# Patient Record
Sex: FEMALE | Race: OTHER | Employment: FULL TIME | URBAN - NONMETROPOLITAN AREA
[De-identification: names, ages, dates, MRNs, and addresses within clinical notes are randomized per-mention and may not be internally consistent; named-entity substitution may affect disease eponyms.]

---

## 2021-01-14 VITALS — HEIGHT: 62 IN

## 2021-02-25 ENCOUNTER — OFFICE VISIT (OUTPATIENT)
Dept: OBGYN CLINIC | Age: 51
End: 2021-02-25
Payer: COMMERCIAL

## 2021-02-25 VITALS
WEIGHT: 163 LBS | DIASTOLIC BLOOD PRESSURE: 86 MMHG | HEIGHT: 62 IN | SYSTOLIC BLOOD PRESSURE: 136 MMHG | BODY MASS INDEX: 30 KG/M2

## 2021-02-25 DIAGNOSIS — Z01.419 WOMEN'S ANNUAL ROUTINE GYNECOLOGICAL EXAMINATION: Primary | ICD-10-CM

## 2021-02-25 PROCEDURE — 99396 PREV VISIT EST AGE 40-64: CPT | Performed by: OBSTETRICS & GYNECOLOGY

## 2021-02-25 ASSESSMENT — ENCOUNTER SYMPTOMS
CONSTIPATION: 0
ABDOMINAL PAIN: 0
SHORTNESS OF BREATH: 0
DIARRHEA: 0

## 2021-02-25 NOTE — PROGRESS NOTES
DATE OF VISIT:  21  PATIENT NAME:  Humphrey Espinoza     YOB: 1970    44 y.o. Chief Complaint   Patient presents with    Annual Exam     s/p hyst.  last mamm 2021.  never had bone density or colonoscopy. Pt. needs refill of metformin to [facility]. No LMP recorded. Patient has had a hysterectomy. Primary Care Physician: Isis Rushing    The patient was seen and examined. She has no chiefcomplaint today and is here for her annual exam.  Her bowels are regular. There are no voiding complaints. She denies any bloating. She denies vaginal discharge and was counseled on STD's and the need for barriercontraception.      HPI : Alba Rees is a 44 y.o. female  who presents today for her annual.    ______________________________________________________________________    OB History    Para Term  AB Living   2 1           SAB TAB Ectopic Molar Multiple Live Births                    # Outcome Date GA Lbr Balbir/2nd Weight Sex Delivery Anes PTL Lv   2             1 Para              Past Medical History:   Diagnosis Date    Abnormal Pap smear of cervix     Uterine fibroid                                                                    Past Surgical History:   Procedure Laterality Date    ANKLE SURGERY      BREAST LUMPECTOMY      HAND SURGERY Right 2020    pins placed s/p fracture    HAND SURGERY Right 2020    pins removed    HYSTERECTOMY, VAGINAL  2019    uterus and cervix with BSO    NOSE SURGERY       Family History   Problem Relation Age of Onset    Diabetes Father     Heart Disease Father     Hypertension Father     Diabetes Mother     Hypertension Mother      Social History     Socioeconomic History    Marital status:      Spouse name: Not on file    Number of children: Not on file    Years of education: Not on file    Highest education level: Not on file   Occupational History    Not on file Social Needs    Financial resource strain: Not on file    Food insecurity     Worry: Not on file     Inability: Not on file    Transportation needs     Medical: Not on file     Non-medical: Not on file   Tobacco Use    Smoking status: Never Smoker    Smokeless tobacco: Never Used   Substance and Sexual Activity    Alcohol use: Yes    Drug use: Never    Sexual activity: Yes   Lifestyle    Physical activity     Days per week: Not on file     Minutes per session: Not on file    Stress: Not on file   Relationships    Social connections     Talks on phone: Not on file     Gets together: Not on file     Attends Jew service: Not on file     Active member of club or organization: Not on file     Attends meetings of clubs or organizations: Not on file     Relationship status: Not on file    Intimate partner violence     Fear of current or ex partner: Not on file     Emotionally abused: Not on file     Physically abused: Not on file     Forced sexual activity: Not on file   Other Topics Concern    Not on file   Social History Narrative    Not on file     Vitals:    02/25/21 1556   BP: 136/86   Site: Right Upper Arm   Position: Sitting   Weight: 163 lb (73.9 kg)   Height: 5' 1.75\" (1.568 m)     Body mass index is 30.05 kg/m². No LMP recorded. Patient has had a hysterectomy. MEDICATIONS:  Current Outpatient Medications   Medication Sig Dispense Refill    metFORMIN (GLUCOPHAGE) 500 MG tablet Take 2 tablets by mouth 2 times daily (with meals) 360 tablet 3    Multiple Vitamins-Minerals (MULTIVITAMIN ADULT PO) Take by mouth       No current facility-administered medications for this visit.         ALLERGIES:  Allergies as of 02/25/2021    (No Known Allergies)           Symptoms of decreased mood absent    **If either question is answered in a  positive fashion then completethe PHQ9 Scoring Evaluation and make the appropriate referral**      Gynecologic History: No LMP recorded. Patient has had a hysterectomy. Sexually Active: Yes    STD History: No     Hormone Replacement Exposure: No      Genetic Qualified Family History of Breast, Ovarian , Colon or Uterine Cancer:No   If YES see scanned worksheet. Preventative Health Testing:  Colposcopy History:   Date of Last Mammogram: 2021  Date of Last Colonoscopy:   Date of Last Bone Density:never - offered to order - pt considering      ______________________________________________________________________    REVIEW OF SYSTEMS:       Review of Systems   Constitutional: Negative for chills, fatigue and fever. Respiratory: Negative for shortness of breath. Cardiovascular: Negative for chest pain. Gastrointestinal: Negative for abdominal pain, constipation and diarrhea. Genitourinary: Negative for dysuria, enuresis, frequency, menstrual problem, pelvic pain, urgency and vaginal bleeding. Had some hot flashes initially after tlh/bso but they resolved   Neurological: Negative for dizziness, light-headedness and headaches. PHYSICAL EXAM:    Physical Exam  Constitutional:       Appearance: Normal appearance. Genitourinary:      Pelvic exam was performed with patient in the lithotomy position. Vulva and vagina normal.      Cervix is absent. Uterus is absent. Right adnexa absent. Left adnexa absent. HENT:      Head: Atraumatic. Mouth/Throat:      Mouth: Mucous membranes are moist.   Eyes:      Extraocular Movements: Extraocular movements intact. Pupils: Pupils are equal, round, and reactive to light. Neck:      Musculoskeletal: Normal range of motion. Cardiovascular:      Rate and Rhythm: Normal rate. Pulmonary:      Effort: Pulmonary effort is normal.   Chest:      Breasts:         Right: Normal.         Left: Normal.   Abdominal:      General: There is no distension. Palpations: Abdomen is soft. Tenderness: There is no abdominal tenderness. Musculoskeletal: Normal range of motion. Neurological:      Mental Status: She is alert and oriented to person, place, and time. Skin:     General: Skin is warm and dry. Psychiatric:         Mood and Affect: Mood normal.         Behavior: Behavior normal.         Thought Content: Thought content normal.         Judgment: Judgment normal.   Chaperone present: chaperone declined. POC Cultures:  No results found for this visit on 02/25/21. ASSESSMENT:      44 y.o. Annual  1. Women's annual routine gynecological examination          There are no active problems to display for this patient. Hereditary Breast, Ovarian, Colon and Uterine Cancer screening Done. Tobacco & Secondary smoke risks reviewed; instructed on cessation and avoidance    PLAN:    Return in about 1 year (around 2/25/2022) for annual.  No orders of the defined types were placed in this encounter. Repeat Annual every 1 year  Cervical Cytology Evaluation begins at 24years old. If Negative Cytology, Follow-up screening per current guidelines. Mammograms every 1year. If 35 yo and last mammogram was negative. Calcium and Vitamin D dosing reviewed. Colonoscopy screening reviewed as well as onset for bone density testing. Routine healthmaintenance per patients PCP.     Electronicallysigned by:  Maritza Turk DO on 02/25/21

## 2021-05-27 ENCOUNTER — OFFICE VISIT (OUTPATIENT)
Dept: OBGYN CLINIC | Age: 51
End: 2021-05-27
Payer: COMMERCIAL

## 2021-05-27 ENCOUNTER — HOSPITAL ENCOUNTER (OUTPATIENT)
Age: 51
Setting detail: SPECIMEN
Discharge: HOME | End: 2021-05-27
Payer: COMMERCIAL

## 2021-05-27 VITALS — BODY MASS INDEX: 30.02 KG/M2 | DIASTOLIC BLOOD PRESSURE: 88 MMHG | SYSTOLIC BLOOD PRESSURE: 138 MMHG | WEIGHT: 162.8 LBS

## 2021-05-27 DIAGNOSIS — N76.0 ACUTE VAGINITIS: ICD-10-CM

## 2021-05-27 DIAGNOSIS — N76.0 ACUTE VAGINITIS: Primary | ICD-10-CM

## 2021-05-27 LAB
DIRECT EXAM: ABNORMAL
Lab: ABNORMAL
SPECIMEN DESCRIPTION: ABNORMAL

## 2021-05-27 PROCEDURE — 99213 OFFICE O/P EST LOW 20 MIN: CPT | Performed by: NURSE PRACTITIONER

## 2021-05-27 RX ORDER — METRONIDAZOLE 7.5 MG/G
1 GEL VAGINAL DAILY
Qty: 1 TUBE | Refills: 0 | Status: SHIPPED | OUTPATIENT
Start: 2021-05-27 | End: 2021-06-01

## 2021-05-27 ASSESSMENT — ENCOUNTER SYMPTOMS: GASTROINTESTINAL NEGATIVE: 1

## 2021-05-27 NOTE — PROGRESS NOTES
PROBLEM VISIT     Date of service: 2021    Ashlyn Braun  Is a 44 y.o. female    PT's PCP is: Odalys Wiseman     : 1970                                             Subjective:       No LMP recorded. Patient has had a hysterectomy. OB History    Para Term  AB Living   2 1           SAB TAB Ectopic Molar Multiple Live Births                    # Outcome Date GA Lbr Balbir/2nd Weight Sex Delivery Anes PTL Lv   2             1 Para                 Social History     Tobacco Use   Smoking Status Never Smoker   Smokeless Tobacco Never Used        Social History     Substance and Sexual Activity   Alcohol Use Yes       Allergies: Patient has no known allergies. Current Outpatient Medications:     metroNIDAZOLE (METROGEL) 0.75 % vaginal gel, Place 1 Applicatorful vaginally daily for 5 days, Disp: 1 Tube, Rfl: 0    metFORMIN (GLUCOPHAGE) 500 MG tablet, Take 2 tablets by mouth 2 times daily (with meals), Disp: 360 tablet, Rfl: 3    Multiple Vitamins-Minerals (MULTIVITAMIN ADULT PO), Take by mouth, Disp: , Rfl:     Social History     Substance and Sexual Activity   Sexual Activity Yes       Chief Complaint   Patient presents with    Vaginal Discharge     Dark yellow-green discharge and foul odor x 1 wk- used Monistat w/o relief.  Vaginal Itching        PE:  Vital Signs  Blood pressure 138/88, weight 162 lb 12.8 oz (73.8 kg). HPI: Patient presents today with complaints of dark yellow/green vaginal discharge and foul odor x 1 week. Reports she used OTC 3 days course of Monistat without relief. Reports external itching at vaginal opening. PT denies fever, chills, nausea and vomiting       Review of Systems   Constitutional: Negative. Gastrointestinal: Negative. Genitourinary: Positive for vaginal discharge and vaginal pain (itching ). Negative for dysuria, frequency, pelvic pain and vaginal bleeding.        Physical Exam  Constitutional:       Appearance: Normal appearance. HENT:      Head: Normocephalic. Pulmonary:      Effort: Pulmonary effort is normal.   Genitourinary:     General: Normal vulva. Vagina: Vaginal discharge present. Musculoskeletal:         General: Normal range of motion. Neurological:      General: No focal deficit present. Mental Status: She is alert. Psychiatric:         Mood and Affect: Mood normal.         Behavior: Behavior normal.     chaperone: not present     Assessment and Plan          Diagnosis Orders   1. Acute vaginitis  metroNIDAZOLE (METROGEL) 0.75 % vaginal gel    Vaginitis DNA Probe             I am having Chrystal Singletary start on metroNIDAZOLE. I am also having her maintain her Multiple Vitamins-Minerals (MULTIVITAMIN ADULT PO) and metFORMIN. Return if symptoms worsen or fail to improve. There are no Patient Instructions on file for this visit.     SHERRELL Vieira NP,5/27/2021 2:17 PM

## 2021-06-01 RX ORDER — METRONIDAZOLE 500 MG/1
500 TABLET ORAL ONCE
Qty: 4 TABLET | Refills: 0 | Status: SHIPPED | OUTPATIENT
Start: 2021-06-01 | End: 2021-06-01

## 2021-07-07 ENCOUNTER — HOSPITAL ENCOUNTER (OUTPATIENT)
Age: 51
Setting detail: SPECIMEN
Discharge: HOME | End: 2021-07-07
Payer: COMMERCIAL

## 2021-07-07 ENCOUNTER — OFFICE VISIT (OUTPATIENT)
Dept: OBGYN CLINIC | Age: 51
End: 2021-07-07
Payer: COMMERCIAL

## 2021-07-07 VITALS — BODY MASS INDEX: 30.28 KG/M2 | WEIGHT: 164.2 LBS | DIASTOLIC BLOOD PRESSURE: 88 MMHG | SYSTOLIC BLOOD PRESSURE: 138 MMHG

## 2021-07-07 DIAGNOSIS — A59.9 TRICHIMONIASIS: ICD-10-CM

## 2021-07-07 DIAGNOSIS — A59.9 TRICHIMONIASIS: Primary | ICD-10-CM

## 2021-07-07 LAB
DIRECT EXAM: ABNORMAL
Lab: ABNORMAL
SPECIMEN DESCRIPTION: ABNORMAL

## 2021-07-07 PROCEDURE — 99213 OFFICE O/P EST LOW 20 MIN: CPT | Performed by: NURSE PRACTITIONER

## 2021-07-07 NOTE — PROGRESS NOTES
PROBLEM VISIT     Date of service: 2021    Jerry Dickerson  Is a 44 y.o. female    PT's PCP is: Jose Angel Blackwood     : 1970                                             Subjective:       No LMP recorded. Patient has had a hysterectomy. OB History    Para Term  AB Living   2 1           SAB TAB Ectopic Molar Multiple Live Births                    # Outcome Date GA Lbr Balbir/2nd Weight Sex Delivery Anes PTL Lv   2             1 Para                 Social History     Tobacco Use   Smoking Status Never Smoker   Smokeless Tobacco Never Used        Social History     Substance and Sexual Activity   Alcohol Use Yes       Allergies: No known allergies      Current Outpatient Medications:     metFORMIN (GLUCOPHAGE) 500 MG tablet, Take 2 tablets by mouth 2 times daily (with meals), Disp: 360 tablet, Rfl: 3    Multiple Vitamins-Minerals (MULTIVITAMIN ADULT PO), Take by mouth, Disp: , Rfl:     Social History     Substance and Sexual Activity   Sexual Activity Yes       Chief Complaint   Patient presents with    Follow-up     LESLIE trich. PE:  Vital Signs  Blood pressure 138/88, weight 164 lb 3.2 oz (74.5 kg). HPI: Patient presents today for LESLIE trichomoniasis. No intercourse since treatment. No longer with partner. PT denies fever, chills, nausea and vomiting       Review of Systems   Constitutional: Negative. Genitourinary: Negative. Physical Exam  Constitutional:       Appearance: Normal appearance. HENT:      Head: Normocephalic. Pulmonary:      Effort: Pulmonary effort is normal.   Genitourinary:     General: Normal vulva. Vagina: Vaginal discharge (thin white) present. No erythema or bleeding. Musculoskeletal:         General: Normal range of motion. Neurological:      General: No focal deficit present. Mental Status: She is alert.    Psychiatric:         Mood and Affect: Mood normal.         Behavior: Behavior normal.         Chaperone: not present   Assessment and Plan          Diagnosis Orders   1. Trichimoniasis  Vaginitis DNA Probe             I am having Chrystal MAL Cornelia Colón maintain her Multiple Vitamins-Minerals (MULTIVITAMIN ADULT PO) and metFORMIN. Return if symptoms worsen or fail to improve. There are no Patient Instructions on file for this visit.     SHERRELL Gramajo - NP,7/7/2021 11:57 AM

## 2021-07-08 ENCOUNTER — PATIENT MESSAGE (OUTPATIENT)
Dept: OBGYN CLINIC | Age: 51
End: 2021-07-08

## 2021-07-08 RX ORDER — FLUCONAZOLE 150 MG/1
150 TABLET ORAL ONCE
Qty: 1 TABLET | Refills: 0 | Status: SHIPPED | OUTPATIENT
Start: 2021-07-08 | End: 2021-07-08

## 2022-01-31 NOTE — TELEPHONE ENCOUNTER
She has an annual appt scheduled for March 2022. Ok to send in another refill to last until that visit?

## 2022-02-24 NOTE — PROGRESS NOTES
YEARLY PHYSICAL    Date of service: 3/9/2022    Adonis Seip  Is a 46 y.o.   female    PT's PCP is: Candido Ospina     : 1970                                         Chaperone for Intimate Exam   Chaperone was offered as part of the rooming process. Patient declined and agrees to continue with exam without a chaperone.  Chaperone: na      Subjective:       No LMP recorded. Patient has had a hysterectomy. Are your menses regular: not applicable    OB History    Para Term  AB Living   2 1           SAB IAB Ectopic Molar Multiple Live Births                    # Outcome Date GA Lbr Balbir/2nd Weight Sex Delivery Anes PTL Lv   2             1 Para                 Social History     Tobacco Use   Smoking Status Never Smoker   Smokeless Tobacco Never Used        Social History     Substance and Sexual Activity   Alcohol Use Yes       Family History   Problem Relation Age of Onset    Diabetes Father     Heart Disease Father     Hypertension Father     Diabetes Mother     Hypertension Mother        Any family history of breast or ovarian cancer: No    Any family history of blood clots: No      Allergies: No known allergies      Current Outpatient Medications:     metFORMIN (GLUCOPHAGE) 500 MG tablet, Take 2 tablets by mouth 2 times daily (with meals), Disp: 360 tablet, Rfl: 3    Social History     Substance and Sexual Activity   Sexual Activity Yes       Any bleeding or pain with intercourse: not currently sexually active    Last Yearly:  2021    Last pap:  NL pt.  Is s/p hyst    Last HPV:  NEG    Last Mammogram: 2022    Last Dexascan Never    Last colorectal screen- type:colonoscopy  date  Never    Do you do self breast exams: Yes    Past Medical History:   Diagnosis Date    Abnormal Pap smear of cervix     Uterine fibroid        Past Surgical History:   Procedure Laterality Date    ANKLE SURGERY  2007    BREAST LUMPECTOMY  2003    HAND SURGERY Right 08/2020    pins placed s/p fracture    HAND SURGERY Right 11/2020    pins removed    HYSTERECTOMY, VAGINAL  2019    uterus and cervix with BSO    NOSE SURGERY  2011       Family History   Problem Relation Age of Onset    Diabetes Father     Heart Disease Father     Hypertension Father     Diabetes Mother     Hypertension Mother        Chief Complaint   Patient presents with    Annual Exam     Pt. denies concerns. needs metformin refill to [facility]. PE:  Vital Signs  Blood pressure 132/80, height 5' 1.75\" (1.568 m), weight 168 lb (76.2 kg). Estimated body mass index is 30.98 kg/m² as calculated from the following:    Height as of this encounter: 5' 1.75\" (1.568 m). Weight as of this encounter: 168 lb (76.2 kg). Labs:    No results found for this visit on 03/09/22. No data recorded    NURSE: Sotero Dobson    HPI: her for annual exam    Review of Systems   Constitutional: Negative for chills, fatigue and fever. Respiratory: Negative for shortness of breath. Cardiovascular: Negative for chest pain. Gastrointestinal: Negative for abdominal pain, constipation and diarrhea. Genitourinary: Negative for dysuria, enuresis, frequency, menstrual problem, pelvic pain, urgency and vaginal bleeding. Neurological: Negative for dizziness, light-headedness and headaches. Physical Exam  Constitutional:       General: She is not in acute distress. Appearance: Normal appearance. She is not ill-appearing. Genitourinary:      Vulva normal.      Vaginal cuff intact. No vaginal discharge. No vaginal prolapse present. Right Adnexa: not tender. Left Adnexa: not tender. Cervix is absent. Uterus is absent. Breasts:      Right: No mass, nipple discharge, skin change or tenderness. Left: No mass, nipple discharge, skin change or tenderness. HENT:      Head: Normocephalic. Cardiovascular:      Rate and Rhythm: Normal rate and regular rhythm. Pulmonary:      Effort: Pulmonary effort is normal.      Breath sounds: Normal breath sounds. Abdominal:      Palpations: Abdomen is soft. Tenderness: There is no abdominal tenderness. There is no guarding or rebound. Musculoskeletal:         General: Normal range of motion. Neurological:      General: No focal deficit present. Mental Status: She is alert. Psychiatric:         Mood and Affect: Mood normal.         Behavior: Behavior normal.                                   Assessment and Plan          Diagnosis Orders   1. Women's annual routine gynecological examination         Repeat Annual every 1 year  Cervical Cytology Evaluation begins at 24years old. If Negative Cytology, Follow-up screening per current guidelines. Mammograms every 1year. If 37 yo and last mammogram was negative. Routine healthmaintenance per patients PCP. I have changed Chrystal Singletary's metFORMIN. Return in about 1 year (around 3/9/2023) for annual.    She was also counseled on her preventative health maintenance recommendations and follow-up. There are no Patient Instructions on file for this visit.     Shanon Downing ,4/3/6596 3:54 PM

## 2022-03-09 ENCOUNTER — OFFICE VISIT (OUTPATIENT)
Dept: OBGYN CLINIC | Age: 52
End: 2022-03-09
Payer: COMMERCIAL

## 2022-03-09 VITALS
DIASTOLIC BLOOD PRESSURE: 80 MMHG | SYSTOLIC BLOOD PRESSURE: 132 MMHG | HEIGHT: 62 IN | WEIGHT: 168 LBS | BODY MASS INDEX: 30.91 KG/M2

## 2022-03-09 DIAGNOSIS — Z01.419 WOMEN'S ANNUAL ROUTINE GYNECOLOGICAL EXAMINATION: Primary | ICD-10-CM

## 2022-03-09 PROCEDURE — 99396 PREV VISIT EST AGE 40-64: CPT | Performed by: OBSTETRICS & GYNECOLOGY

## 2022-03-09 ASSESSMENT — ENCOUNTER SYMPTOMS
ABDOMINAL PAIN: 0
DIARRHEA: 0
SHORTNESS OF BREATH: 0
CONSTIPATION: 0

## 2023-03-15 ENCOUNTER — OFFICE VISIT (OUTPATIENT)
Dept: OBGYN CLINIC | Age: 53
End: 2023-03-15
Payer: COMMERCIAL

## 2023-03-15 VITALS
HEIGHT: 63 IN | BODY MASS INDEX: 30.3 KG/M2 | WEIGHT: 171 LBS | DIASTOLIC BLOOD PRESSURE: 66 MMHG | SYSTOLIC BLOOD PRESSURE: 104 MMHG

## 2023-03-15 DIAGNOSIS — Z01.419 WOMEN'S ANNUAL ROUTINE GYNECOLOGICAL EXAMINATION: Primary | ICD-10-CM

## 2023-03-15 PROCEDURE — 99396 PREV VISIT EST AGE 40-64: CPT | Performed by: OBSTETRICS & GYNECOLOGY

## 2023-03-15 RX ORDER — OMEPRAZOLE 20 MG/1
CAPSULE, DELAYED RELEASE ORAL
COMMUNITY
Start: 2023-01-27

## 2023-03-15 RX ORDER — LISINOPRIL 5 MG/1
TABLET ORAL
COMMUNITY
Start: 2023-01-27

## 2023-03-15 ASSESSMENT — ENCOUNTER SYMPTOMS
ABDOMINAL PAIN: 0
SHORTNESS OF BREATH: 0
DIARRHEA: 0
CONSTIPATION: 0

## 2023-03-15 NOTE — PROGRESS NOTES
YEARLY PHYSICAL    Date of service: 3/15/2023    Jose Flannery  Is a 45 y.o. female    PT's PCP is: Poornima Orellana     : 1970                                         Chaperone for Intimate Exam  Chaperone was offered as part of the rooming process. Patient declined and agrees to continue with exam without a chaperone. Chaperone: N/A      Subjective:       No LMP recorded. Patient has had a hysterectomy. Are your menses regular: not applicable    OB History    Para Term  AB Living   2 1           SAB IAB Ectopic Molar Multiple Live Births                    # Outcome Date GA Lbr Balbir/2nd Weight Sex Delivery Anes PTL Lv   2             1 Para                 Social History     Tobacco Use   Smoking Status Never   Smokeless Tobacco Never        Social History     Substance and Sexual Activity   Alcohol Use Yes    Alcohol/week: 8.0 standard drinks    Types: 8 Cans of beer per week    Comment: Mostly on weekends; don't drink much during week anymore. Family History   Problem Relation Age of Onset    Diabetes Father     Heart Disease Father     Hypertension Father     Diabetes Mother     Hypertension Mother        Any family history of breast or ovarian cancer: No    Any family history of blood clots: No      Allergies: No known allergies      Current Outpatient Medications:     lisinopril (PRINIVIL;ZESTRIL) 5 MG tablet, TAKE 1 TABLET BY MOUTH DAILY, Disp: , Rfl:     omeprazole (PRILOSEC) 20 MG delayed release capsule, TAKE 1 CAPSULE BY MOUTH DAILY BEFORE BREAKFAST, Disp: , Rfl:     metFORMIN (GLUCOPHAGE) 500 MG tablet, Take 2 tablets by mouth 2 times daily (with meals), Disp: 360 tablet, Rfl: 3    Social History     Substance and Sexual Activity   Sexual Activity Yes    Partners: Male    Comment: Not currently in a relationship, but less than 6 months ago.        Any bleeding or pain with intercourse: No    Last Yearly:  3-9-2022    Last pap: 2016 (s/p TLH 2019)    Last HPV: ?    Last Mammogram: 2-2-2023    Last Gilljamel Obandos Never    Last colorectal screen- type:Colonoscopy is scheduled on March 31st.    Do you do self breast exams: Yes    Past Medical History:   Diagnosis Date    Abnormal Pap smear of cervix     Hypertension     Menopausal symptoms Summer 2022    Hot flashes, mild moodiness    STD (sexually transmitted disease) May 2021    Trichomoniasis    Uterine fibroid        Past Surgical History:   Procedure Laterality Date    ANKLE SURGERY  2007    BREAST LUMPECTOMY  2003    HAND SURGERY Right 08/2020    pins placed s/p fracture    HAND SURGERY Right 11/2020    pins removed    HYSTERECTOMY (CERVIX STATUS UNKNOWN)  June 2020    Still have both ovaries, all else gone. HYSTERECTOMY, VAGINAL  2019    uterus and cervix with BSO    MYOMECTOMY  June 2020    See Hysterectomy    NOSE SURGERY  2011       Family History   Problem Relation Age of Onset    Diabetes Father     Heart Disease Father     Hypertension Father     Diabetes Mother     Hypertension Mother        Chief Complaint   Patient presents with    Annual Exam     Patient denies concerns. Patient request 90 day supply refill of Metformin to [facility]. PE:  Vital Signs  Blood pressure 104/66, height 5' 3\" (1.6 m), weight 171 lb (77.6 kg). Estimated body mass index is 30.29 kg/m² as calculated from the following:    Height as of this encounter: 5' 3\" (1.6 m). Weight as of this encounter: 171 lb (77.6 kg). NURSE: Vaishnavi Daniels CMA           Review of Systems   Constitutional:  Negative for chills, fatigue and fever. Respiratory:  Negative for shortness of breath. Cardiovascular:  Negative for chest pain. Gastrointestinal:  Negative for abdominal pain, constipation and diarrhea. Genitourinary:  Negative for dysuria, enuresis, frequency, menstrual problem, pelvic pain, urgency and vaginal bleeding.    Neurological:  Negative for dizziness, light-headedness and headaches. Physical Exam  Constitutional:       Appearance: Normal appearance. Genitourinary:      Vulva, bladder and urethral meatus normal.      Right Labia: No rash or lesions. Left Labia: No lesions or rash. No labial fusion noted. Vaginal cuff intact. No vaginal discharge. No vaginal prolapse present. No vaginal atrophy present. Right Adnexa: not tender and no mass present. Left Adnexa: not tender and no mass present. Cervix is absent. No cervical motion tenderness. No parametrium nodularity present. Uterus is absent. Breasts:     Breasts are soft. Right: No inverted nipple, mass, nipple discharge, skin change or tenderness. Left: No inverted nipple, mass, nipple discharge, skin change or tenderness. HENT:      Head: Normocephalic and atraumatic. Eyes:      Extraocular Movements: Extraocular movements intact. Pupils: Pupils are equal, round, and reactive to light. Cardiovascular:      Rate and Rhythm: Normal rate. Pulmonary:      Effort: Pulmonary effort is normal.   Abdominal:      General: There is no distension. Palpations: Abdomen is soft. There is no mass. Tenderness: There is no abdominal tenderness. Musculoskeletal:         General: Normal range of motion. Cervical back: Normal range of motion. Neurological:      General: No focal deficit present. Mental Status: She is alert and oriented to person, place, and time. Skin:     General: Skin is warm and dry. Psychiatric:         Mood and Affect: Mood normal.         Behavior: Behavior normal.         Thought Content: Thought content normal.         Judgment: Judgment normal.                           Assessment & Plan  1. Women's annual routine gynecological examination      Repeat Annual every 1 year  Cervical Cytology Evaluation begins at 24years old. If Negative Cytology, Follow-up screening per current guidelines. Mammograms every 1year. If 35 yo and last mammogram was negative. Routine healthmaintenance per patients PCP.     Return in about 1 year (around 3/15/2024) for annual.       Electronically Signed by Riri Mac DO

## 2024-03-20 ENCOUNTER — OFFICE VISIT (OUTPATIENT)
Dept: OBGYN CLINIC | Age: 54
End: 2024-03-20
Payer: COMMERCIAL

## 2024-03-20 VITALS
SYSTOLIC BLOOD PRESSURE: 112 MMHG | BODY MASS INDEX: 30.55 KG/M2 | HEIGHT: 62 IN | WEIGHT: 166 LBS | DIASTOLIC BLOOD PRESSURE: 82 MMHG

## 2024-03-20 DIAGNOSIS — Z01.419 WOMEN'S ANNUAL ROUTINE GYNECOLOGICAL EXAMINATION: Primary | ICD-10-CM

## 2024-03-20 PROCEDURE — 99396 PREV VISIT EST AGE 40-64: CPT | Performed by: OBSTETRICS & GYNECOLOGY

## 2024-03-20 RX ORDER — ESCITALOPRAM OXALATE 5 MG/1
5 TABLET ORAL DAILY
COMMUNITY
Start: 2024-01-10

## 2024-03-20 RX ORDER — ALPRAZOLAM 0.25 MG/1
0.25 TABLET ORAL 2 TIMES DAILY PRN
COMMUNITY
Start: 2023-12-14

## 2024-03-20 RX ORDER — OMEPRAZOLE 40 MG/1
40 CAPSULE, DELAYED RELEASE ORAL DAILY
COMMUNITY
Start: 2023-08-22

## 2024-03-20 ASSESSMENT — ENCOUNTER SYMPTOMS
SHORTNESS OF BREATH: 0
CONSTIPATION: 0
DIARRHEA: 0
ABDOMINAL PAIN: 0

## 2024-03-20 NOTE — PROGRESS NOTES
YEARLY PHYSICAL    Date of service: 3/20/2024    Chrystal Singletary  Is a 53 y.o. female    PT's PCP is: Surendra Hancock MD     : 1970                                         Chaperone for Intimate Exam  Chaperone was offered as part of the rooming process. Patient declined and agrees to continue with exam without a chaperone.  Chaperone: N/A      Subjective:       No LMP recorded. Patient has had a hysterectomy.     Are your menses regular: not applicable    OB History    Para Term  AB Living   2 1           SAB IAB Ectopic Molar Multiple Live Births                    # Outcome Date GA Lbr Balbir/2nd Weight Sex Delivery Anes PTL Lv   2             1 Para                 Social History     Tobacco Use   Smoking Status Never   Smokeless Tobacco Never        Social History     Substance and Sexual Activity   Alcohol Use Yes    Alcohol/week: 8.0 standard drinks of alcohol    Types: 8 Cans of beer per week    Comment: Mostly on weekends; don't drink much during week anymore.       Family History   Problem Relation Age of Onset    Diabetes Father     Heart Disease Father     Hypertension Father     Diabetes Mother     Hypertension Mother        Any family history of breast or ovarian cancer: No    Any family history of blood clots: No      Allergies: No known allergies      Current Outpatient Medications:     omeprazole (PRILOSEC) 40 MG delayed release capsule, Take 1 capsule by mouth daily, Disp: , Rfl:     escitalopram (LEXAPRO) 5 MG tablet, Take 1 tablet by mouth daily, Disp: , Rfl:     ALPRAZolam (XANAX) 0.25 MG tablet, Take 1 tablet by mouth 2 times daily as needed., Disp: , Rfl:     metFORMIN (GLUCOPHAGE) 500 MG tablet, TAKE 2 TABLETS TWICE A DAY WITH MEALS, Disp: 360 tablet, Rfl: 0    lisinopril (PRINIVIL;ZESTRIL) 5 MG tablet, TAKE 1 TABLET BY MOUTH DAILY, Disp: , Rfl:     Social History     Substance and Sexual Activity

## 2025-03-25 ASSESSMENT — ENCOUNTER SYMPTOMS
CONSTIPATION: 0
RESPIRATORY NEGATIVE: 1
GASTROINTESTINAL NEGATIVE: 1
ABDOMINAL PAIN: 0
SHORTNESS OF BREATH: 0
DIARRHEA: 0

## 2025-03-25 NOTE — PROGRESS NOTES
YEARLY PHYSICAL    Date of service: 3/27/2025    Chrystal Singletary  Is a 54 y.o.   female    PT's PCP is: Surendra Hancock MD     : 1970                                             Subjective:       No LMP recorded. Patient has had a hysterectomy.     Are your menses regular: not applicable    OB History    Para Term  AB Living   2 1       SAB IAB Ectopic Molar Multiple Live Births              # Outcome Date GA Lbr Balbir/2nd Weight Sex Type Anes PTL Lv   2             1 Para                 Social History     Tobacco Use   Smoking Status Never   Smokeless Tobacco Never        Social History     Substance and Sexual Activity   Alcohol Use Yes    Alcohol/week: 8.0 standard drinks of alcohol    Types: 8 Cans of beer per week    Comment: Mostly on weekends; don't drink much during week anymore.       Family History   Problem Relation Age of Onset    Diabetes Father     Heart Disease Father     Hypertension Father     Diabetes Mother     Hypertension Mother        Any family history of breast or ovarian cancer: No    Any family history of blood clots: No      Allergies: No known allergies      Current Outpatient Medications:     omeprazole (PRILOSEC) 40 MG delayed release capsule, Take 1 capsule by mouth daily, Disp: , Rfl:     escitalopram (LEXAPRO) 5 MG tablet, Take 1 tablet by mouth daily, Disp: , Rfl:     ALPRAZolam (XANAX) 0.25 MG tablet, Take 1 tablet by mouth 2 times daily as needed., Disp: , Rfl:     metFORMIN (GLUCOPHAGE) 500 MG tablet, Take 2 tablets by mouth 2 times daily (with meals), Disp: 360 tablet, Rfl: 3    lisinopril (PRINIVIL;ZESTRIL) 5 MG tablet, TAKE 1 TABLET BY MOUTH DAILY, Disp: , Rfl:     Social History     Substance and Sexual Activity   Sexual Activity Not Currently    Partners: Male    Comment: Not currently in a relationship, but more than 6 months ago.       Any bleeding or pain with intercourse: not

## 2025-03-27 ENCOUNTER — OFFICE VISIT (OUTPATIENT)
Dept: OBGYN CLINIC | Age: 55
End: 2025-03-27
Payer: COMMERCIAL

## 2025-03-27 VITALS
SYSTOLIC BLOOD PRESSURE: 114 MMHG | WEIGHT: 173 LBS | BODY MASS INDEX: 31.83 KG/M2 | HEIGHT: 62 IN | DIASTOLIC BLOOD PRESSURE: 74 MMHG

## 2025-03-27 DIAGNOSIS — Z01.419 VISIT FOR GYNECOLOGIC EXAMINATION: Primary | ICD-10-CM

## 2025-03-27 DIAGNOSIS — Z76.0 MEDICATION REFILL: ICD-10-CM

## 2025-03-27 PROCEDURE — 99396 PREV VISIT EST AGE 40-64: CPT | Performed by: ADVANCED PRACTICE MIDWIFE
